# Patient Record
Sex: MALE | Race: WHITE | NOT HISPANIC OR LATINO | URBAN - METROPOLITAN AREA
[De-identification: names, ages, dates, MRNs, and addresses within clinical notes are randomized per-mention and may not be internally consistent; named-entity substitution may affect disease eponyms.]

---
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
## 2023-09-06 NOTE — PROCEDURE: ADDITIONAL NOTES
Patient [facility] today. Called and LM asking for return call to clinic to r/s appt ASAP.    
Detail Level: Simple
Additional Notes: Bio oil \\nDiscussed Micro-needling, multiple treatments needed \\nConsult w/ Malika regarding microneedling tx’s\\n\\nRecently went through a growth spurt
Additional Notes: Continue facials\\nDiscussed increasing tretinoin dose, pt states tretinoin.025 works well \\nLHA cleanser